# Patient Record
Sex: MALE | URBAN - METROPOLITAN AREA
[De-identification: names, ages, dates, MRNs, and addresses within clinical notes are randomized per-mention and may not be internally consistent; named-entity substitution may affect disease eponyms.]

---

## 2017-08-30 NOTE — PATIENT DISCUSSION
[facility] 36 point screen: I have reviewed the visual fields both taped and untaped on this patient which demonstrate significant obstruction of the patient's peripheral visual field on both eyes.

## 2017-08-30 NOTE — PATIENT DISCUSSION
PHOTOGRAPHS: I have reviewed the external ocular photographs of this patient which show the following: significant dermatochalasis of both upper eyelids

## 2017-10-27 NOTE — PATIENT DISCUSSION
Reviewed skin care/ sun avoidance at length. Written instructions given. Eye crea given.  Follow up 4-6 weeks, sooner for problems

## 2017-10-27 NOTE — PATIENT DISCUSSION
Also, please do not hesitate to call us if you have any concerns not addressed by this information. Please call [phone] and we will do everything we can to help you during this period.

## 2017-10-27 NOTE — PATIENT DISCUSSION
Resume normal activity. Resume any medications that were discontinued for surgery. Stop cold compresses. Artificial tears qid and prn. Wash incisions/ lash line once daily with baby shampoo.

## 2019-03-14 NOTE — PATIENT DISCUSSION
PATIENT O TRY DVO CONTACTS AND NEW GLASSES,  IS STILL HAVING ISSUES  [address], CONSIDER CATARACT SURGERY

## 2019-08-27 ENCOUNTER — IMPORTED ENCOUNTER (OUTPATIENT)
Dept: URBAN - METROPOLITAN AREA CLINIC 50 | Facility: CLINIC | Age: 59
End: 2019-08-27

## 2019-09-04 ENCOUNTER — IMPORTED ENCOUNTER (OUTPATIENT)
Dept: URBAN - METROPOLITAN AREA CLINIC 50 | Facility: CLINIC | Age: 59
End: 2019-09-04

## 2020-01-13 NOTE — PATIENT DISCUSSION
CONJ HYPEREMIA OU - MILD. 2' CL OVERWEAR/SLEEPING IN LENSES. LIDS FLIPPED TODAY; NO CL IN EITHER EYE. REASSURED ON ALL FINDINGS. DISPENSED ATS; RX QID+ UNTIL FULLY RESOLVED. ADVISED TO GLASSES UNTIL EYES FEEL BETTER. MONITOR PRN.

## 2021-04-23 ASSESSMENT — VISUAL ACUITY
OS_CC: J1@ 16 IN
OS_BAT: >20/400
OS_CC: 20/25-
OD_BAT: >20/400
OD_CC: CF @ 3'
OS_OTHER: >20/400.
OD_OTHER: >20/400.
OD_PH: 20/60-
OD_CC: J1@ 16 IN

## 2021-04-23 ASSESSMENT — TONOMETRY
OD_IOP_MMHG: 14
OS_IOP_MMHG: 14